# Patient Record
Sex: FEMALE | Race: ASIAN
[De-identification: names, ages, dates, MRNs, and addresses within clinical notes are randomized per-mention and may not be internally consistent; named-entity substitution may affect disease eponyms.]

---

## 2019-09-23 ENCOUNTER — HOSPITAL ENCOUNTER (OUTPATIENT)
Dept: HOSPITAL 92 - BICMAMMO | Age: 44
Discharge: HOME | End: 2019-09-23
Attending: NURSE PRACTITIONER
Payer: COMMERCIAL

## 2019-09-23 DIAGNOSIS — Z12.31: Primary | ICD-10-CM

## 2019-09-23 PROCEDURE — 77067 SCR MAMMO BI INCL CAD: CPT

## 2019-09-23 PROCEDURE — 77063 BREAST TOMOSYNTHESIS BI: CPT

## 2019-09-23 NOTE — MMO
Bilateral MAMMO Bilat Screen [facility].

 

CLINICAL HISTORY:

Patient is 43 years old and is seen for screening. The patient has no family

history of breast cancer.  The patient has no personal history of cancer.

 

VIEWS:

The views performed were:  bilateral craniocaudal with tomosynthesis and

bilateral mediolateral oblique with tomosynthesis.

 

FILMS COMPARED:

The present examination has been compared to prior imaging studies performed at

[facility] on 08/25/2016, 09/14/2017 and 09/21/2018.

 

This study has been interpreted with the assistance of computer-aided detection.

 

MAMMOGRAM FINDINGS:

The breasts are heterogeneously dense, which could obscure a lesion on

mammography.

 

There are no suspicious masses, suspicious calcifications, or new areas of

architectural distortion.

 

IMPRESSION:

THERE IS NO MAMMOGRAPHIC EVIDENCE OF MALIGNANCY.

 

A ROUTINE FOLLOW-UP MAMMOGRAM IN 1 YEAR IS RECOMMENDED.

 

THE RESULTS OF THIS EXAM WERE SENT TO THE PATIENT.

 

ACR BI-RADS Category 1 - Negative

 

MAMMOGRAPHY NOTE:

 1. A negative mammogram report should not delay a biopsy if a dominant of

 clinically suspicious mass is present.

 2. Approximately 10% to 15% of breast cancers are not detected by

 mammography.

 3. Adenosis and dense breasts may obscure an underlying neoplasm.

 

 

Reported by: ROBERTO ALEX MD

Electonically Signed: [id number]

## 2020-09-24 ENCOUNTER — HOSPITAL ENCOUNTER (OUTPATIENT)
Dept: HOSPITAL 92 - BICMAMMO | Age: 45
Discharge: HOME | End: 2020-09-24
Attending: NURSE PRACTITIONER
Payer: COMMERCIAL

## 2020-09-24 DIAGNOSIS — Z12.31: Primary | ICD-10-CM

## 2020-09-24 DIAGNOSIS — N64.89: ICD-10-CM

## 2020-09-24 PROCEDURE — 77063 BREAST TOMOSYNTHESIS BI: CPT

## 2020-09-24 PROCEDURE — 77067 SCR MAMMO BI INCL CAD: CPT

## 2020-09-24 NOTE — MMO
Bilateral MAMMO Bilat Screen [facility].

 

CLINICAL HISTORY:

Patient is 44 years old and is seen for screening. The patient has no family

history of breast cancer.  The patient has no personal history of cancer.

 

VIEWS:

The views performed were:  bilateral craniocaudal with tomosynthesis and

bilateral mediolateral oblique with tomosynthesis.

 

FILMS COMPARED:

The present examination has been compared to prior imaging studies performed at

[facility] on 08/25/2016, 09/14/2017, 09/21/2018 and 09/23/2019.

 

This study has been interpreted with the assistance of computer-aided detection.

 

MAMMOGRAM FINDINGS:

The breasts are heterogeneously dense, which could obscure a lesion on

mammography.

 

There is a focal asymmetry seen in the inner region of the left breast.

 

In the right breast, there are no suspicious masses, calcifications or areas of

architectural distortion.

 

IMPRESSION:

FOCAL ASYMMETRY IN THE LEFT BREAST REQUIRES ADDITIONAL EVALUATION. SPOT

COMPRESSION IS RECOMMENDED. AN ULTRASOUND EXAM IS RECOMMENDED IF NEEDED.

ADDITIONAL IMAGING.

 

THE RESULTS OF THIS EXAM WERE SENT TO THE PATIENT.

 

ACR BI-RADS Category 0 - Incomplete:  Need additional imaging evaluation. [facility] will notify the patient of the need for additional imaging services.

 

MAMMOGRAPHY NOTE:

 1. A negative mammogram report should not delay a biopsy if a dominant of

 clinically suspicious mass is present.

 2. Approximately 10% to 15% of breast cancers are not detected by

 mammography.

 3. Adenosis and dense breasts may obscure an underlying neoplasm.

 

 

Reported by: EPIFANIO BAUGH MD

Electonically Signed: [id number]

## 2020-09-28 ENCOUNTER — HOSPITAL ENCOUNTER (OUTPATIENT)
Dept: HOSPITAL 92 - BICMAMMO | Age: 45
Discharge: HOME | End: 2020-09-28
Attending: NURSE PRACTITIONER
Payer: COMMERCIAL

## 2020-09-28 DIAGNOSIS — N64.89: Primary | ICD-10-CM

## 2020-09-28 PROCEDURE — G0279 TOMOSYNTHESIS, MAMMO: HCPCS

## 2020-09-28 NOTE — ULT
ULTRASOUND LEFT BREAST:

 

INDICATION: 

Ultrasound inner lower left breast performed to assess a density seen on mammography.

 

FINDINGS: 

There is an 8 mm cyst seen at 6-7 o'clock which may correspond to the mammogram density.  No other so
nographic abnormality identified.  Representative images were obtained throughout the inner left ann
st.

 

Recommend the patient be returned to routine mammogram followup.

 

IMPRESSION: 

BIRADS 2: benign findings.

 

 

POS: OFF

## 2020-09-28 NOTE — MMO
Left Breast MAMMO Unilat Diag [facility].

 

CLINICAL HISTORY:

Patient is 44 years old and is seen for diagnostic exam. The patient has no

family history of breast cancer.  The patient has no personal history of cancer.

 

VIEWS:

The views performed were:  left craniocaudal with tomosynthesis; left

mediolateral oblique with tomosynthesis; and left mediolateral with

tomosynthesis.

 

FILMS COMPARED:

The present examination has been compared to prior imaging studies performed at

[facility] on 09/21/2018, 09/23/2019, 09/24/2020 and 09/28/2020.

 

This study has been interpreted with the assistance of computer-aided detection.

 

MAMMOGRAM FINDINGS:

The breast is heterogeneously dense, which could obscure a lesion on mammography.

 

Cyst seen on ultrasound. No other sonographic abnormality.

 

There are no suspicious masses, suspicious calcifications, or new areas of

architectural distortion.

 

IMPRESSION:

THERE IS NO MAMMOGRAPHIC EVIDENCE OF MALIGNANCY.

 

A ROUTINE FOLLOW-UP MAMMOGRAM IN 1 YEAR IS RECOMMENDED.

 

THE RESULTS OF THIS EXAM WERE SENT TO THE PATIENT.

 

ACR BI-RADS Category 2 - Benign finding

 

MAMMOGRAPHY NOTE:

 1. A negative mammogram report should not delay a biopsy if a dominant of

 clinically suspicious mass is present.

 2. Approximately 10% to 15% of breast cancers are not detected by

 mammography.

 3. Adenosis and dense breasts may obscure an underlying neoplasm.

 

 

Reported by: LA NENA GARZA MD

Electonically Signed: [id number]